# Patient Record
Sex: MALE | URBAN - METROPOLITAN AREA
[De-identification: names, ages, dates, MRNs, and addresses within clinical notes are randomized per-mention and may not be internally consistent; named-entity substitution may affect disease eponyms.]

---

## 2022-02-03 ENCOUNTER — OFFICE VISIT (OUTPATIENT)
Dept: URBAN - METROPOLITAN AREA CLINIC 6 | Facility: CLINIC | Age: 57
End: 2022-02-03
Payer: COMMERCIAL

## 2022-02-03 DIAGNOSIS — H33.012 RETINAL DETACHMENT WITH SINGLE BREAK, LEFT EYE: ICD-10-CM

## 2022-02-03 DIAGNOSIS — H25.813 COMBINED FORMS OF AGE-RELATED CATARACT, BILATERAL: Primary | ICD-10-CM

## 2022-02-03 PROCEDURE — 99024 POSTOP FOLLOW-UP VISIT: CPT | Performed by: OPHTHALMOLOGY

## 2022-02-03 ASSESSMENT — INTRAOCULAR PRESSURE
OD: 25
OS: 15

## 2022-02-03 NOTE — IMPRESSION/PLAN
Impression: Retinal detachment with single break, left eye: H33.012. Plan: Have call in to [facility] to see if they can assist with funds. Referral letter to [facility] ER given to patient with diagnosis and query whether they can assist patient with obtaining [facility] with subsequent referral to [facility], and referral letter for Dr. Melo De La Rosa in [facility] with contact info.

## 2022-02-03 NOTE — IMPRESSION/PLAN
Impression: Combined forms of age-related cataract, bilateral: H25.813. Plan: Moderate cataract both eyes: Appears to be visually significant and cataract extraction was offered to the patient, however, would need to fix RD OS first.

Have call in to [facility] to see if they can assist with funds. Referral letter to [facility] ER given to patient with diagnosis and query whether they can assist patient with obtaining Care Plus with subsequent referral to [facility], and referral letter for Dr. Kvng Espinal in [facility] with contact info. [facility] informs us that they can help with financing to go to [facility] but cannot assist with retinal surgery in [facility] because it is too expensive. Araceli Thomas to call patient to explain this option. Patient is aware that RD must be repaired with surgery within 7 days or permanent vision loss will result.